# Patient Record
Sex: FEMALE | Race: WHITE | Employment: FULL TIME | URBAN - METROPOLITAN AREA
[De-identification: names, ages, dates, MRNs, and addresses within clinical notes are randomized per-mention and may not be internally consistent; named-entity substitution may affect disease eponyms.]

---

## 2024-05-24 ENCOUNTER — TELEPHONE (OUTPATIENT)
Dept: ORTHOPEDICS CLINIC | Facility: CLINIC | Age: 54
End: 2024-05-24

## 2024-05-24 DIAGNOSIS — M25.562 PAIN IN BOTH KNEES, UNSPECIFIED CHRONICITY: Primary | ICD-10-CM

## 2024-05-24 DIAGNOSIS — M25.561 PAIN IN BOTH KNEES, UNSPECIFIED CHRONICITY: Primary | ICD-10-CM

## 2024-05-24 NOTE — TELEPHONE ENCOUNTER
Patient is scheduled for OLAYINKA knee pain. Please advise if imaging is needed.  Future Appointments   Date Time Provider Department Center   5/31/2024  7:50 AM [facility] Lopez [id number]

## 2024-05-28 ENCOUNTER — HOSPITAL ENCOUNTER (OUTPATIENT)
Dept: GENERAL RADIOLOGY | Age: 54
Discharge: HOME | End: 2024-05-28
Attending: PHYSICIAN ASSISTANT
Payer: COMMERCIAL

## 2024-05-28 DIAGNOSIS — M25.562 PAIN IN BOTH KNEES, UNSPECIFIED CHRONICITY: ICD-10-CM

## 2024-05-28 DIAGNOSIS — M25.561 PAIN IN BOTH KNEES, UNSPECIFIED CHRONICITY: ICD-10-CM

## 2024-05-28 PROCEDURE — 73564 X-RAY EXAM KNEE 4 OR MORE: CPT | Performed by: PHYSICIAN ASSISTANT

## 2024-05-31 ENCOUNTER — OFFICE VISIT (OUTPATIENT)
Dept: ORTHOPEDICS CLINIC | Facility: CLINIC | Age: 54
End: 2024-05-31

## 2024-05-31 VITALS — HEIGHT: 66 IN | WEIGHT: 220 LBS | BODY MASS INDEX: 35.36 KG/M2

## 2024-05-31 DIAGNOSIS — S83.207A POSITIVE MCMURRAY TEST OF LEFT KNEE, INITIAL ENCOUNTER: Primary | ICD-10-CM

## 2024-05-31 DIAGNOSIS — G89.29 CHRONIC PAIN OF RIGHT KNEE: ICD-10-CM

## 2024-05-31 DIAGNOSIS — M25.561 CHRONIC PAIN OF RIGHT KNEE: ICD-10-CM

## 2024-05-31 PROCEDURE — 3008F BODY MASS INDEX DOCD: CPT | Performed by: PHYSICIAN ASSISTANT

## 2024-05-31 PROCEDURE — 99204 OFFICE O/P NEW MOD 45 MIN: CPT | Performed by: PHYSICIAN ASSISTANT

## 2024-05-31 RX ORDER — MELOXICAM 15 MG/1
15 TABLET ORAL DAILY
Qty: 14 TABLET | Refills: 1 | Status: SHIPPED | OUTPATIENT
Start: 2024-05-31

## 2024-05-31 NOTE — H&P
[facility] - ORTHOPEDICS  [address] [place] [id number]  [phone]     NEW PATIENT VISIT - HISTORY AND PHYSICAL EXAMINATION     Name: Maira Pacheco   MRN: [id number]  Date: 5/31/2024     CC: Bilateral knee pain.     REFERRED BY: No primary care provider on file.    HPI:   Maira Pacheco is a very pleasant 54 year old female who presents today for evaluation, consultation, and management of BILATERAL KNEE pain, left greater than right- ongoing since 5/4 - and had pain after increased activity. She has swelling, tightness, 4/10 pain. She complains of swelling, stiffness. She works as an occupational therapist.       PMH:   No past medical history on file.    PAST SURGICAL HX:  No past surgical history on file.    FAMILY HX:  No family history on file.    ALLERGIES:  Patient has no known allergies.    MEDICATIONS:   Current Outpatient Medications   Medication Sig Dispense Refill    lisinopril 10 MG Oral Tab TK 1 T PO QD  0    MINOCYCLINE-ACNE CARE PRODUCTS 100 MG CO  MG EVERY 12 HOURS (Patient not taking: Reported on 5/31/2024)      LUSTRA-ULTRA 4 % EX CREA BID TO AFFECTED AREAS OF FACE (Patient not taking: Reported on 5/31/2024) 2 OZ 1       ROS: A comprehensive 14 point review of systems was performed and was negative aside from the aforementioned per history of present illness.    SOCIAL HX:  Social History     Occupational History    Not on file   Tobacco Use    Smoking status: Never    Smokeless tobacco: Never   Substance and Sexual Activity    Alcohol use: Not on file    Drug use: Not on file    Sexual activity: Not on file       PE:   Vitals:    05/31/24 0814   Weight: 220 lb (99.8 kg)   Height: 5' 6\" (1.676 m)     Estimated body mass index is 35.51 kg/m² as calculated from the following:    Height as of this encounter: 5' 6\" (1.676 m).    Weight as of this encounter: 220 lb (99.8 kg).    Physical Exam  Constitutional:       Appearance: Normal appearance.   HENT:       Head: Normocephalic and atraumatic.   Eyes:      Extraocular Movements: Extraocular movements intact.   Neck:      Musculoskeletal: Normal range of motion and neck supple.   Cardiovascular:      Pulses: Normal pulses.   Pulmonary:      Effort: Pulmonary effort is normal. No respiratory distress.   Abdominal:      General: There is no distension.   Skin:     General: Skin is warm.      Capillary Refill: Capillary refill takes less than 2 seconds.      Findings: No bruising.   Neurological:      General: No focal deficit present.      Mental Status: Alert.   Psychiatric:         Mood and Affect: Mood normal.     Examination of the left knee demonstrates:     Skin is intact, warm and dry.   Atrophy: none    Effusion: small    Joint line tenderness: medial  Crepitation: none   Arielle: Positive   Patellar mobility: normal without apprehension  J-sign: none    ROM: Extension full  Flexion 90 degrees  ACL:  Negative Lachman, Negative Pivot Shift   PCL:  Negative Posterior Drawer  Collateral Ligaments: Stable to Varus and Valgus stress at 0 and 30 degrees  Strength: normal   Hip joint: normal pain-free ROM   Gait:  normal   Leg length: equal and symmetric  Alignment:  neutral     No obvious peripheral edema noted.   Distal neurovascular exam demonstrates normal perfusion, intact sensation to light touch and full strength.     Examination of the contralateral knee demonstrates:  No significant atrophy, swelling or effusion. Full range of motion. Neurovascularly intact distally.    Radiographic Examination/Diagnostics:  I personally viewed, independently interpreted and radiology report was reviewed.      XR KNEE, COMPLETE (4 OR MORE VIEWS), LEFT (CPT=73564)    Result Date: 5/28/2024  PROCEDURE:  XR KNEE, COMPLETE (4 OR MORE VIEWS), LEFT (CPT=73564)  TECHNIQUE:  AP, lateral, sunrise, and tunnel views were obtained  COMPARISON:  None.  INDICATIONS:  M25.562 Pain in both knees, unspecified chronicity M25.561 Pain in both  knees, unspecified chronicity  PATIENT STATED HISTORY: (As transcribed by Technologist)  Ortho consult. Patient states chronic bilateral knee pain.    FINDINGS:  BONES:  No acute fracture or dislocation.  Mild tricompartment degenerative changes noted most pronounced in the medial and patellofemoral compartments. SOFT TISSUES:  Negative.  No visible soft tissue swelling. EFFUSION:  Small suprapatellar joint effusion is noted. OTHER:  Negative.            CONCLUSION:  Mild degenerative changes with small suprapatellar joint effusion.   LOCATION:  [id number]   Dictated by (CST): Mauricio Gaspar MD on 5/28/2024 at 5:03 PM     Finalized by (CST): Mauricio Gaspar MD on 5/28/2024 at 5:04 PM       XR KNEE, COMPLETE (4 OR MORE VIEWS), RIGHT (CPT=73564)    Result Date: 5/28/2024  PROCEDURE:  XR KNEE, COMPLETE (4 OR MORE VIEWS), RIGHT (CPT=73564)  TECHNIQUE:  AP, lateral, sunrise, and tunnel views were obtained  COMPARISON:  None.  INDICATIONS:  M25.562 Pain in both knees, unspecified chronicity M25.561 Pain in both knees, unspecified chronicity  PATIENT STATED HISTORY: (As transcribed by Technologist)  Ortho consult. Patient states chronic bilateral knee pain.    FINDINGS:  BONES:  No acute fracture or dislocation.  Mild degenerative changes most pronounced in the patellofemoral compartment is noted.. SOFT TISSUES:  Mild soft tissue swelling is present. EFFUSION:  Small suprapatellar joint effusion. OTHER:  Negative.            CONCLUSION:  Small suprapatellar joint effusion with mild soft tissue swelling.   LOCATION:  [id number]   Dictated by (CST): Mauricio Gaspar MD on 5/28/2024 at 5:03 PM     Finalized by (CST): Mauricio Gaspar MD on 5/28/2024 at 5:03 PM         IMPRESSION: Maira Pacheco is a 54 year old female who presents with left knee pain concerning for meniscus tear, and right knee pain.     PLAN:   We had a detailed discussion outlining the etiology, anatomy, pathophysiology, and natural history of the patient's findings.  Imaging was reviewed in detail and correlated to a 3-dimensional model of the patient's pathology.     We reviewed the treatment of this disease condition.  We recommended physical therapy for right knee and gait to aid in strengthening, range of motion, functional improvement, and return to baseline activity.     In light of the chronicity of symptoms, loss of normal function, and  failure to progress conservatively we recommend an MRI to evaluate the integrity of the patient's left knee meniscus. The patient will follow up after imaging.   Differential diagnosis includes but not limited to: cartilage injury/loose body, meniscus tear/injury, ACL tear, bone marrow edema, and osteoarthritis.     External records were also reviewed for pertinent historical findings contributing to the patients undiagnosed new problem with uncertain prognosis.     The patient had the opportunity to ask questions and all questions were answered appropriately.    FOLLOW-UP:  Return to clinic following completion of MRI to review scan and findings.             Kaitlin Lopez [facility], PA-C Orthopedic Surgery / Sports Medicine Specialist  [facility] Orthopaedic Surgery  [address] [place] [address] [id number] [place] [email or web address]  [email or web address]  t: [phone]  o: [phone]  f: [phone]    This note was dictated using Dragon software.  While it was briefly proofread prior to completion, some grammatical, spelling, and word choice errors due to dictation may still occur.

## 2024-06-03 ENCOUNTER — HOSPITAL ENCOUNTER (OUTPATIENT)
Dept: MRI IMAGING | Age: 54
Discharge: HOME | End: 2024-06-03
Attending: PHYSICIAN ASSISTANT
Payer: COMMERCIAL

## 2024-06-03 DIAGNOSIS — S83.207A POSITIVE MCMURRAY TEST OF LEFT KNEE, INITIAL ENCOUNTER: ICD-10-CM

## 2024-06-03 PROCEDURE — 73721 MRI JNT OF LWR EXTRE W/O DYE: CPT | Performed by: PHYSICIAN ASSISTANT

## 2024-06-05 ENCOUNTER — OFFICE VISIT (OUTPATIENT)
Dept: ORTHOPEDICS CLINIC | Facility: CLINIC | Age: 54
End: 2024-06-05
Payer: COMMERCIAL

## 2024-06-05 DIAGNOSIS — M94.20 CHONDROMALACIA: ICD-10-CM

## 2024-06-05 DIAGNOSIS — S83.207D POSITIVE MCMURRAY TEST OF LEFT KNEE, SUBSEQUENT ENCOUNTER: Primary | ICD-10-CM

## 2024-06-05 PROCEDURE — 99213 OFFICE O/P EST LOW 20 MIN: CPT | Performed by: PHYSICIAN ASSISTANT

## 2024-06-05 NOTE — PROGRESS NOTES
[facility] - ORTHOPEDICS  [address] [place] [id number]  [phone]       Name: Maira Pacheco   MRN: [id number]  Date: 6/5/2024     REASON FOR VISIT: Follow up for left knee pain.    INTERVAL HISTORY:  Maira Pacheco is a 54 year old female who returns for evaluation of left knee pain.  To summarize she has had ongoing knee pain since May 4, 2024.  No specific injury.  At her last visit we recommended an MRI.  She presents today for evaluation.      ROS: ROS    PE:   There were no vitals filed for this visit.  Estimated body mass index is 35.51 kg/m² as calculated from the following:    Height as of 5/31/24: 5' 6\" (1.676 m).    Weight as of 5/31/24: 220 lb (99.8 kg).    Physical Exam  Constitutional:       Appearance: Normal appearance.   HENT:      Head: Normocephalic and atraumatic.   Eyes:      Extraocular Movements: Extraocular movements intact.   Neck:      Musculoskeletal: Normal range of motion and neck supple.   Cardiovascular:      Pulses: Normal pulses.   Pulmonary:      Effort: Pulmonary effort is normal. No respiratory distress.   Abdominal:      General: There is no distension.   Skin:     General: Skin is warm.      Capillary Refill: Capillary refill takes less than 2 seconds.      Findings: No bruising.   Neurological:      General: No focal deficit present.      Mental Status: She is alert.   Psychiatric:         Mood and Affect: Mood normal.     Examination of the left knee demonstrates:      Skin is intact, warm and dry.   Atrophy: none    Effusion: small    Joint line tenderness: medial  Crepitation: none   Arielle: Positive   Patellar mobility: normal without apprehension  J-sign: none    ROM: Extension full  Flexion 90 degrees  ACL:  Negative Lachman, Negative Pivot Shift   PCL:  Negative Posterior Drawer  Collateral Ligaments: Stable to Varus and Valgus stress at 0 and 30 degrees  Strength: normal   Hip joint: normal pain-free ROM   Gait:  normal   Leg  length: equal and symmetric  Alignment:  neutral      No obvious peripheral edema noted.   Distal neurovascular exam demonstrates normal perfusion, intact sensation to light touch and full strength.      Examination of the contralateral knee demonstrates:  No significant atrophy, swelling or effusion. Full range of motion. Neurovascularly intact distally.       Radiographic Examination/Diagnostics:    I personally viewed, independently interpreted and radiology report was reviewed.    MRI KNEE, LEFT (JNF=63847)    Result Date: 6/3/2024  PROCEDURE:  MRI KNEE, LEFT (JSU=51743)  COMPARISON:  OLI Mcintosh, XR KNEE, COMPLETE (4 OR MORE VIEWS), LEFT (CPT=73564), 5/28/2024, 3:54 PM.  INDICATIONS:  S83.207A Positive Arielle test of left knee, initial encounter  TECHNIQUE:  Axial, coronal, and sagittal proton density with and without fat saturation images were obtained.  PATIENT STATED HISTORY: (As transcribed by Technologist)  Patient complains of left medial knee pain.    FINDINGS:  LIGAMENTS:          The ACL, PCL, patellar retinacula, and collateral ligament complexes are intact. MENISCI:            The medial meniscus reveals a small radial tear along the free edge involving the junction of the posterior horn and body.  The lateral meniscus is unremarkable. TENDONS:            The tendinous insertions about the knee are intact without significant tendinosis or tears. MUSCULATURE:        No evidence of strain, edema, or atrophy. BONY COMPARTMENTS:  There is a mild degree of tricompartmental chondromalacia.  No evidence of libertad osteoarthritis.  No focal osseous lesions or abnormal bone marrow/cortical signal about the knee suggested. SYNOVIUM:           Mild joint effusion.             CONCLUSION:  1. Small radial tear along the junction of the posterior horn and body of the medial meniscus, involving the free edge.  2. Mild tricompartmental chondromalacia without libertad osteoarthritis. 3. Mild joint effusion.    LOCATION:  [facility]          Dictated by (CST): Maliha Bills DO on 6/03/2024 at 4:04 PM     Finalized by (CST): Maliha Bills DO on 6/03/2024 at 4:10 PM       XR KNEE, COMPLETE (4 OR MORE VIEWS), LEFT (CPT=73564)    Result Date: 5/28/2024  PROCEDURE:  XR KNEE, COMPLETE (4 OR MORE VIEWS), LEFT (CPT=73564)  TECHNIQUE:  AP, lateral, sunrise, and tunnel views were obtained  COMPARISON:  None.  INDICATIONS:  M25.562 Pain in both knees, unspecified chronicity M25.561 Pain in both knees, unspecified chronicity  PATIENT STATED HISTORY: (As transcribed by Technologist)  Ortho consult. Patient states chronic bilateral knee pain.    FINDINGS:  BONES:  No acute fracture or dislocation.  Mild tricompartment degenerative changes noted most pronounced in the medial and patellofemoral compartments. SOFT TISSUES:  Negative.  No visible soft tissue swelling. EFFUSION:  Small suprapatellar joint effusion is noted. OTHER:  Negative.            CONCLUSION:  Mild degenerative changes with small suprapatellar joint effusion.   LOCATION:  [facility]   Dictated by (CST): Mauricio Gaspar MD on 5/28/2024 at 5:03 PM     Finalized by (CST): Mauricio Gaspar MD on 5/28/2024 at 5:04 PM       XR KNEE, COMPLETE (4 OR MORE VIEWS), RIGHT (CPT=73564)    Result Date: 5/28/2024  PROCEDURE:  XR KNEE, COMPLETE (4 OR MORE VIEWS), RIGHT (CPT=73564)  TECHNIQUE:  AP, lateral, sunrise, and tunnel views were obtained  COMPARISON:  None.  INDICATIONS:  M25.562 Pain in both knees, unspecified chronicity M25.561 Pain in both knees, unspecified chronicity  PATIENT STATED HISTORY: (As transcribed by Technologist)  Ortho consult. Patient states chronic bilateral knee pain.    FINDINGS:  BONES:  No acute fracture or dislocation.  Mild degenerative changes most pronounced in the patellofemoral compartment is noted.. SOFT TISSUES:  Mild soft tissue swelling is present. EFFUSION:  Small suprapatellar joint effusion. OTHER:  Negative.            CONCLUSION:  Small suprapatellar  joint effusion with mild soft tissue swelling.   LOCATION:  [id number]   Dictated by (CST): Mauricio Gaspar MD on 5/28/2024 at 5:03 PM     Finalized by (CST): Mauricio Gaspar MD on 5/28/2024 at 5:03 PM         IMPRESSION: Maira Pacheco is a 54 year old female who presented for follow up of left knee pain consistent with meniscus tear and osteoarthritis.    PLAN:   We had a detailed discussion outlining the etiology, anatomy, pathophysiology, and natural history of the patient's findings.    We reviewed the treatment of this disease condition.  Fortunately, treatment is amenable to conservative treatment which we chose to optimize at today's visit.  We discussed the etiology, pathophysiology, clinical manifestations and treatment of knee osteoarthritis. We discussed treatment including, but not limited to: weight loss, activity modification, RICE modalities, NSAIDs, steroid injections, viscosupplementation, and surgical intervention.     We recommended physical therapy to aid in strengthening, range of motion, functional improvement, and return to baseline activity.  The patient had opportunity to ask questions and all questions were answered appropriately.    I spent 20 minutes in preparation to see the patient, counseling/education of relevant pathology, discussing imaging results, ordering therapy  intervention, care coordination, and documentation into the electronic medical record.      FOLLOW-UP:  Return to clinic on an as needed basis.             Kaitlin Lopez, [facility], PA-C Orthopedic Surgery / Sports Medicine Specialist  [facility]opaedic [facility]  [address] [place] [address].org  [email or web address]  t: [phone]  o: [phone]  f: [phone]    This note was dictated using Dragon software.  While it was briefly proofread prior to completion, some grammatical, spelling, and word choice errors due to dictation may still occur.

## 2025-07-01 ENCOUNTER — HOSPITAL ENCOUNTER (OUTPATIENT)
Dept: GENERAL RADIOLOGY | Facility: HOSPITAL | Age: 55
Discharge: HOME | End: 2025-07-01
Attending: ORTHOPAEDIC SURGERY
Payer: COMMERCIAL

## 2025-07-01 ENCOUNTER — OFFICE VISIT (OUTPATIENT)
Age: 55
End: 2025-07-01
Payer: COMMERCIAL

## 2025-07-01 VITALS — BODY MASS INDEX: 33.27 KG/M2 | HEIGHT: 66 IN | WEIGHT: 207 LBS

## 2025-07-01 DIAGNOSIS — R52 PAIN: ICD-10-CM

## 2025-07-01 DIAGNOSIS — M17.11 PRIMARY OSTEOARTHRITIS OF RIGHT KNEE: Primary | ICD-10-CM

## 2025-07-01 PROCEDURE — 99204 OFFICE O/P NEW MOD 45 MIN: CPT | Performed by: ORTHOPAEDIC SURGERY

## 2025-07-01 PROCEDURE — 73564 X-RAY EXAM KNEE 4 OR MORE: CPT | Performed by: RADIOLOGY

## 2025-07-01 PROCEDURE — 3008F BODY MASS INDEX DOCD: CPT | Performed by: ORTHOPAEDIC SURGERY

## 2025-07-01 RX ORDER — CELECOXIB 200 MG/1
200 CAPSULE ORAL 2 TIMES DAILY
Qty: 60 CAPSULE | Refills: 0 | Status: SHIPPED | OUTPATIENT
Start: 2025-07-01

## 2025-07-01 NOTE — PROGRESS NOTES
Chief Complaint: Knee Pain (Patient has 2/10 right knee pain. Patient has on and off sharp pain since january 2025)      HPI  Ms. Pacheco is referred by No ref. provider found. Maira Pacheco is a 55 year old female being seen in the office today for Knee Pain (Patient has 2/10 right knee pain. Patient has on and off sharp pain since january 2025)    Patient is a 55-year-old female presents today with complaints of right knee pain.  Pain localized to the medial aspect of the knee.  She notes it with weightbearing.  Denies any significant pain when she is resting.  Denies any mechanical symptoms denies any instability symptoms.  She denies any acute trauma.  She does note that she is unable to fully straighten the knee as well.  She feels that the knee is stiff.  Denies any numbness or tingling or hip or back pain today.  She has not had any formal treatment.  She initially was taking some meloxicam for prior knee issue on the left.  She is not taking this currently.  Otherwise she has not had any formal treatment.  She remains relatively active.  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  Of note, the patient does have a history as a 16-year-old of a tibia fracture that was treated with a cast.  No surgery was required.  She cannot recall if she had an injury to the knee at that time.    History:  Past Medical History[1]  Past Surgical History[2]  Family History[3]  No family status information on file.     Social History     Occupational History    Not on file   Tobacco Use    Smoking status: Never    Smokeless tobacco: Never   Substance and Sexual Activity    Alcohol use: Not on file    Drug use: Not on file    Sexual activity: Not on file       Medications:  Current Medications[4]    Allergies:  Allergies[5]    Review of Systems  A comprehensive review of systems was completed and is negative unless noted above or in the HPI.    Physical Exam  Ht 5' 6\" (1.676 m)   Wt 207 lb (93.9 kg)    BMI 33.41 kg/m²   Body mass index is 33.41 kg/m².    Constitutional: The patient appears well-developed and well-nourished, in no apparent distress.   Psychiatric: The patient demonstrates good comprehension, judgment and decision making. Normal mood and affect.  Eyes: PER and EOM are normal.  ENT: Hearing appropriate for normal conversation.  Cardiovascular: The patient has symmetric pulses, 2+.  Distal extremity is warm and well perfused with good capillary refill.  Respiratory:  The patient is breathing comfortably without increased respiratory effort or use of accessory muscles.  Hematologic/Lymphatic: No lymphangitis. There is no appreciable enlargement of lymph nodes. No calf swelling, calf non-tender, negative Francois's sign. No edema.  Skin: No wounds or ulcers. No hypertrophic scarring.  Neck: FROM without pain.  MSK:  Gait: Antalgic to the right    right Knee        Alignment: Varus, correctable       Skin: Intact       Swelling: None   Effusion: Negative   Tenderness: Medial joint line       Range of Motion:      Extension: 7     Flexion: 130     Flexion Contracture: 7     Extensor La           McMurrays: Negative   Lachmann: Negative   Anterior Drawer: Negative   Posterior Drawer: Negative   Varus Stress Test: stable   Valgus Stress Test: stable       Patellar Tracking: Centrally   Patellar Crepitus: No   Extensor Mechanism: Intact       Hip ROM:   Intact       Sensation: intact   Motor: intact   Vascular: intact     Imaging:  I independently reviewed the patient's relevant imaging studies today.    4 weightbearing views of the affected knee were obtained today.  They demonstrate no acute fracture or dislocation.  They show medial compartment joint space narrowing, osteophyte formation, and subchondral sclerosis on the AP view, consistent with Kellgren-Saleem grade 4 osteoarthritis.    Labs:  No results found for: \"WBC\", \"HGB\", \"HCT\", \"PLT\"  No results found for: \"PT\", \"PTT\", \"ALB\", \"A1C\"  No  results found for: \"GLU\"    Assessment and Plan  Assessment & Plan  Primary osteoarthritis of right knee         Pain    Orders:    XR KNEE, COMPLETE (4 OR MORE VIEWS), RIGHT (CPT=73564); Future    I had a lengthy discussion with the patient today about the patient's knee OA.  Nonoperative and operative options for knee OA were discussed with the patient at length. Nonoperative measures include activity modification, anti-inflammatories, weight loss if applicable, physical therapy or a home exercise program, bracing, cortisone injections and viscosupplementation injections.  Risks and benefits to injections were reviewed including but not limited to infection, skin discoloration/changes, temporary elevations in blood sugars, joint inflammatory reaction, persistent pain or increased pain at the injection site, and the chance that the injection may not help. If they fail these measures, and continue to suffer from functionally limiting pain that is negatively impacting their quality of life, a knee replacement can be considered.  Understanding all options, the patient elected for a very conservative approach initiated.  She would like to start with initiation of an anti-inflammatory specifically, I recommend the following:    They were given prescription for Celebrex.  GI precautions reviewed today.  They were recommended low impact aerobic exercises such as biking, elliptical, and swimming/water therapy. They were recommended against high impact activities such as running on hard surfaces and deep squatting activities.  There are no structural abnormalities to their knee, so the patient can be WBAT and activities as tolerated.  Follow up: 6 to 8 weeks.  If she has no improvement, I would recommend cortisone injection.  The patient understands and agrees with this plan. All of the patient's questions were answered today.      BMI is above average; BMI management plan is completed    Corrine Turner MD         [1] No  past medical history on file.  [2] No past surgical history on file.  [3] No family history on file.  [4]   Current Outpatient Medications   Medication Sig Dispense Refill    celecoxib (CELEBREX) 200 MG Oral Cap Take 1 capsule (200 mg total) by mouth 2 (two) times daily. 60 capsule 0    lisinopril 10 MG Oral Tab TK 1 T PO QD  0    Meloxicam 15 MG Oral Tab Take 1 tablet (15 mg total) by mouth daily. (Patient not taking: Reported on 7/1/2025) 14 tablet 1    MINOCYCLINE-ACNE CARE PRODUCTS 100 MG CO  MG EVERY 12 HOURS (Patient not taking: Reported on 7/1/2025)      LUSTRA-ULTRA 4 % EX CREA BID TO AFFECTED AREAS OF FACE (Patient not taking: Reported on 7/1/2025) 2 OZ 1   [5] No Known Allergies

## 2025-08-22 ENCOUNTER — OFFICE VISIT (OUTPATIENT)
Dept: ORTHOPEDICS CLINIC | Facility: CLINIC | Age: 55
End: 2025-08-22

## 2025-08-22 DIAGNOSIS — M17.11 PRIMARY OSTEOARTHRITIS OF RIGHT KNEE: Primary | ICD-10-CM

## 2025-08-22 PROCEDURE — 20610 DRAIN/INJ JOINT/BURSA W/O US: CPT | Performed by: ORTHOPAEDIC SURGERY

## 2025-08-22 PROCEDURE — 99214 OFFICE O/P EST MOD 30 MIN: CPT | Performed by: ORTHOPAEDIC SURGERY

## 2025-08-22 RX ORDER — TRIAMCINOLONE ACETONIDE 40 MG/ML
40 INJECTION, SUSPENSION INTRA-ARTICULAR; INTRAMUSCULAR ONCE
Status: COMPLETED | OUTPATIENT
Start: 2025-08-22 | End: 2025-08-22

## 2025-08-22 RX ADMIN — TRIAMCINOLONE ACETONIDE 40 MG: 40 INJECTION, SUSPENSION INTRA-ARTICULAR; INTRAMUSCULAR at 12:31:00
